# Patient Record
Sex: MALE | Race: WHITE | HISPANIC OR LATINO | URBAN - NONMETROPOLITAN AREA
[De-identification: names, ages, dates, MRNs, and addresses within clinical notes are randomized per-mention and may not be internally consistent; named-entity substitution may affect disease eponyms.]

---

## 2021-09-08 ENCOUNTER — OFFICE VISIT (OUTPATIENT)
Dept: URGENT CARE | Facility: PHYSICIAN GROUP | Age: 56
End: 2021-09-08
Payer: COMMERCIAL

## 2021-09-08 VITALS
DIASTOLIC BLOOD PRESSURE: 84 MMHG | BODY MASS INDEX: 31.41 KG/M2 | OXYGEN SATURATION: 98 % | TEMPERATURE: 97.6 F | RESPIRATION RATE: 14 BRPM | SYSTOLIC BLOOD PRESSURE: 180 MMHG | HEIGHT: 64 IN | HEART RATE: 98 BPM | WEIGHT: 184 LBS

## 2021-09-08 DIAGNOSIS — B00.9 HSV INFECTION: ICD-10-CM

## 2021-09-08 DIAGNOSIS — B35.6 TINEA CRURIS: ICD-10-CM

## 2021-09-08 PROCEDURE — 99204 OFFICE O/P NEW MOD 45 MIN: CPT | Performed by: PHYSICIAN ASSISTANT

## 2021-09-08 RX ORDER — ACYCLOVIR 200 MG/1
800 CAPSULE ORAL
Qty: 100 CAPSULE | Refills: 2 | Status: SHIPPED | OUTPATIENT
Start: 2021-09-08 | End: 2021-09-13

## 2021-09-08 RX ORDER — SITAGLIPTIN AND METFORMIN HYDROCHLORIDE 1000; 50 MG/1; MG/1
1 TABLET, FILM COATED ORAL 2 TIMES DAILY WITH MEALS
COMMUNITY

## 2021-09-08 RX ORDER — ENALAPRIL MALEATE 2.5 MG/1
5 TABLET ORAL
COMMUNITY
Start: 2021-08-19

## 2021-09-08 RX ORDER — PIOGLITAZONEHYDROCHLORIDE 15 MG/1
15 TABLET ORAL DAILY
COMMUNITY
Start: 2021-04-21

## 2021-09-08 RX ORDER — FLUCONAZOLE 150 MG/1
150 TABLET ORAL DAILY
Qty: 1 TABLET | Refills: 0 | Status: SHIPPED | OUTPATIENT
Start: 2021-09-08

## 2021-09-08 RX ORDER — ENALAPRIL MALEATE 2.5 MG/1
5 TABLET ORAL DAILY
COMMUNITY
Start: 2021-04-21 | End: 2021-09-08

## 2021-09-08 RX ORDER — VALACYCLOVIR HYDROCHLORIDE 1 G/1
1000 TABLET, FILM COATED ORAL 3 TIMES DAILY
Qty: 21 TABLET | Refills: 0 | Status: CANCELLED | OUTPATIENT
Start: 2021-09-08 | End: 2021-09-15

## 2021-09-08 RX ORDER — ATORVASTATIN CALCIUM 40 MG/1
40 TABLET, FILM COATED ORAL
COMMUNITY
Start: 2021-08-19

## 2021-09-08 NOTE — PROGRESS NOTES
Chief Complaint   Patient presents with   • Rash     pt is requesting a specific medication, shingles pt states, PC        HISTORY OF PRESENT ILLNESS: Patient is a 56 y.o. male who presents today for the following:    Burning rash started 4-5 days ago  Has been diagnosed with HSV in the past  Known diabetic  Not painful but is burning  No penile discharge; h/o gonorrhea  Blisters in the groin area  In the past was treated with zovirax from [place]  Not currently sexually active    There are no problems to display for this patient.      Allergies:Patient has no known allergies.    Current Outpatient Medications Ordered in Epic   Medication Sig Dispense Refill   • metformin (GLUCOPHAGE) 1000 MG tablet Take 1,000 mg by mouth.     • enalapril (VASOTEC) 2.5 MG Tab Take 5 mg by mouth.     • pioglitazone (ACTOS) 15 MG Tab Take 15 mg by mouth every day.     • atorvastatin (LIPITOR) 40 MG Tab Take 40 mg by mouth.     • SITagliptin (JANUVIA) 100 MG Tab Take 100 mg by mouth every day.     • sitagliptan-metformin (JANUMET)  MG per tablet Take 1 Tablet by mouth 2 times a day with meals.     • acyclovir (ZOVIRAX) 200 MG Cap Take 4 Capsules by mouth 5 Times a Day for 5 days. 100 Capsule 2   • fluconazole (DIFLUCAN) 150 MG tablet Take 1 Tablet by mouth every day. 1 Tablet 0     No current Epic-ordered facility-administered medications on file.       History reviewed. No pertinent past medical history.    Social History     Tobacco Use   • Smoking status: Never Smoker   • Smokeless tobacco: Never Used   Substance Use Topics   • Alcohol use: Yes   • Drug use: Never       No family status information on file.   History reviewed. No pertinent family history.    Review of Systems:   Constitutional ROS: No unexpected change in weight, No weakness, No fatigue  Pulmonary ROS: No chronic cough, sputum, or hemoptysis, No dyspnea on exertion, No wheezing  Cardiovascular ROS: No diaphoresis, No edema, No  "palpitations  Hematologic/Lymphatic ROS: No chills, No night sweats, No weight loss  Skin/Integumentary ROS: Genital rash    Exam:  BP (!) 180/84   Pulse 98   Temp 36.4 °C (97.6 °F)   Resp 14   Ht 1.626 m (5' 4\")   Wt 83.5 kg (184 lb)   SpO2 98%   General: Well developed, well nourished. No distress.    HENT: Head is grossly normal.  Pulmonary: Unlabored respiratory effort.   Neurologic: Grossly nonfocal. No facial asymmetry noted.  Musculoskeletal: Patient is uncircumcised.  Diffuse erythema is noted of the head of the penis and diffusely on the inner aspect of the foreskin.  Patient does have a few scattered ulcerations noted on the inside of the foreskin.  No penile drainage is noted.  Skin: Warm, dry, good turgor. No rashes in visible areas.   Psych: Normal mood. Alert and oriented to person, place and time.    Assessment/Plan:  Exam findings are consistent with HSV.  Refilling Zovirax as this has worked for patient in the past.  Started fluconazole for tinea.  Discussed appropriate over-the-counter symptomatic medication, and when to return to clinic. Follow up for worsening or persistent symptoms.    Entire clinic visit was conducted using  #[id number].  1. HSV infection  acyclovir (ZOVIRAX) 200 MG Cap   2. Tinea cruris  fluconazole (DIFLUCAN) 150 MG tablet       "